# Patient Record
Sex: FEMALE | Race: WHITE | Employment: PART TIME | URBAN - NONMETROPOLITAN AREA
[De-identification: names, ages, dates, MRNs, and addresses within clinical notes are randomized per-mention and may not be internally consistent; named-entity substitution may affect disease eponyms.]

---

## 2023-12-03 NOTE — PROGRESS NOTES
[facility] initial consultation note    Naima Newton                                                Chief complaint: Naima Newton is a 26 y. o.oldfemale came for further evaluation regarding her hypersomnia (excessive daytime sleepiness) and ?sleep apnea  with referral from Ms. Vamshi Larry, APRN - CNP    [place]:    Sleep/Wake schedule:  Usual time to go to bed during the work/regular day of week: 8 PM to 10 PM  Usual time to wake up during the work//regular day of week: 4:30 AM to 6:30 AM  Over the weekends her sleep schedule: [x] Remain same. She usually falls a sleep in less than: 10 minutes to 1 hour  She takes naps: Yes. Number of naps per month: 2-3 times per month  During each nap she spends a total of: 1 to 4 hours  The naps were reported as refreshing: No      Sleep Hygiene:  Is the temperature and evironment in her bed room is acceptable to her: Yes. She watches Television in her bed room: Yes. Occasionally we walk. She read books, study, pay bills etc in the bed: Yes. She usually read books before going to sleep. She do not to work with any electronic media before going to sleep. Frequency She wake up during night/sleep: 2 or more  Majority of nocturnal awakenings are for urination: Yes. She goes to restroom sometimes  Difficulty in falling back to sleep after nocturnal awakenings: Yes. She takes up to 30 minutes to 1 hour to go back to sleep during her nocturnal awakenings    Do you drink coffee: Yes. She drinks 1 cup of coffee in the morning       Do you drink caffeinated beverages i.e sodas: Yes.  1 to 2 cans of caffeinated soda per throughout the day. Diet Pepsi or Dr. Liz Junior you drink tea: No.     Do you drink alcoholic beverages: Yes.   She drinks alcohol very rarely  History of recreational drug use: No    Social History     Tobacco Use    Smoking status: Former     Types: Cigarettes     Start date: 2019

## 2023-12-27 RX ORDER — PEPPERMINT OIL 90 MG
CAPSULE, DELAYED, AND EXTENDED RELEASE ORAL
COMMUNITY

## 2023-12-27 RX ORDER — POLYETHYLENE GLYCOL 3350 17 G/17G
17 POWDER, FOR SOLUTION ORAL DAILY PRN
COMMUNITY
End: 2023-12-29

## 2023-12-27 RX ORDER — DEXTROAMPHETAMINE SACCHARATE, AMPHETAMINE ASPARTATE, DEXTROAMPHETAMINE SULFATE AND AMPHETAMINE SULFATE 2.5; 2.5; 2.5; 2.5 MG/1; MG/1; MG/1; MG/1
10 TABLET ORAL DAILY
COMMUNITY

## 2023-12-27 RX ORDER — BUSPIRONE HYDROCHLORIDE 7.5 MG/1
7.5 TABLET ORAL 2 TIMES DAILY
COMMUNITY

## 2023-12-27 RX ORDER — HYDROCHLOROTHIAZIDE 25 MG/1
25 TABLET ORAL DAILY
COMMUNITY

## 2023-12-27 RX ORDER — ALBUTEROL SULFATE 90 UG/1
2 AEROSOL, METERED RESPIRATORY (INHALATION) EVERY 6 HOURS PRN
COMMUNITY

## 2023-12-27 RX ORDER — ONDANSETRON 4 MG/1
4 TABLET, FILM COATED ORAL EVERY 8 HOURS PRN
COMMUNITY

## 2023-12-27 RX ORDER — FLUTICASONE PROPIONATE 44 UG/1
1 AEROSOL, METERED RESPIRATORY (INHALATION) 2 TIMES DAILY
COMMUNITY

## 2023-12-27 RX ORDER — PEDI MV NO.227/FERROUS SULFATE 10 MG
TABLET,CHEWABLE ORAL
COMMUNITY

## 2023-12-27 RX ORDER — OMEPRAZOLE 40 MG/1
40 CAPSULE, DELAYED RELEASE ORAL DAILY
COMMUNITY
End: 2023-12-29

## 2023-12-27 RX ORDER — LOSARTAN POTASSIUM 100 MG/1
100 TABLET ORAL DAILY
COMMUNITY

## 2023-12-29 ENCOUNTER — OFFICE VISIT (OUTPATIENT)
Dept: PULMONOLOGY | Age: 23
End: 2023-12-29
Payer: COMMERCIAL

## 2023-12-29 VITALS
OXYGEN SATURATION: 98 % | SYSTOLIC BLOOD PRESSURE: 132 MMHG | HEIGHT: 66 IN | WEIGHT: 269 LBS | BODY MASS INDEX: 43.23 KG/M2 | DIASTOLIC BLOOD PRESSURE: 78 MMHG | TEMPERATURE: 98 F | HEART RATE: 107 BPM

## 2023-12-29 DIAGNOSIS — G47.10 HYPERSOMNIA: ICD-10-CM

## 2023-12-29 DIAGNOSIS — R06.83 SNORING: ICD-10-CM

## 2023-12-29 DIAGNOSIS — G47.30 SLEEP APNEA, UNSPECIFIED TYPE: Primary | ICD-10-CM

## 2023-12-29 PROCEDURE — G8484 FLU IMMUNIZE NO ADMIN: HCPCS | Performed by: INTERNAL MEDICINE

## 2023-12-29 PROCEDURE — G8419 CALC BMI OUT NRM PARAM NOF/U: HCPCS | Performed by: INTERNAL MEDICINE

## 2023-12-29 PROCEDURE — G8427 DOCREV CUR MEDS BY ELIG CLIN: HCPCS | Performed by: INTERNAL MEDICINE

## 2023-12-29 PROCEDURE — 99204 OFFICE O/P NEW MOD 45 MIN: CPT | Performed by: INTERNAL MEDICINE

## 2023-12-29 PROCEDURE — 4004F PT TOBACCO SCREEN RCVD TLK: CPT | Performed by: INTERNAL MEDICINE

## 2023-12-29 NOTE — PROGRESS NOTES
Chief Complaint: New sleep consult, no prior studies    Mallampati airway Class:4  Neck Circumference:.15.75 Inches    Jarales sleepiness score 12/29/23: 16  Sleep apnea quality of life questionnaire:.47

## 2023-12-29 NOTE — PATIENT INSTRUCTIONS
Recommendations/Plan:  - Schedule patient for nocturnal polysomnogram (Sleep study) with split night protocol at [facility] sleep lab to diagnose sleep apnea and to get optimal pressure I.e CPAP or BiPAP to start/continue PAP therapy. Patient to follow with my clinic at [facility] sleep clinic in 6 to 8 weeks with CPAP download for further evaluation.  -I had a discussion with patient regarding avialable treatment options for her sleep disorder breathing including but not limited to CPAP titration in the sleep lab Vs.Dental appliance placement with referral to a local dentist Vs other available surgical options including Uvulopalatopharyngoplasty, maxillomandibular ostomy, Inspire device placement and tracheostomy as last option. At the end of discussion, she decided on CPAP/BiPAP/AutoSV as a treatment if she found to have obstructive sleep apnea during above test/study.  -She was educated to practice good sleep hygiene practices. She was provided with a good sleep hygiene hand out.  -Dian Guevara was advised to make earlier appointment with my clinic if she develops any worsening of sleep symptoms. She verbalizes understanding.  -Dian Guevara was advised to not to drive any motor vehicles or operate heavy equipment until her sleep symptoms are under good control. Leyla Husain verbalizes understanding.  -She was advised to loose weight by controlling diet and doing exercise once cleared by her family physician.   - Louis Pascual and her brother were educated about my impression and plan. They verbalizes understanding.

## 2024-02-09 ENCOUNTER — HOSPITAL ENCOUNTER (OUTPATIENT)
Dept: SLEEP CENTER | Age: 24
End: 2024-02-09
Payer: COMMERCIAL

## 2024-02-09 DIAGNOSIS — G47.10 HYPERSOMNIA: ICD-10-CM

## 2024-02-09 DIAGNOSIS — G47.30 SLEEP APNEA, UNSPECIFIED TYPE: ICD-10-CM

## 2024-02-09 DIAGNOSIS — R06.83 SNORING: ICD-10-CM

## 2024-02-09 PROCEDURE — 95810 POLYSOM 6/> YRS 4/> PARAM: CPT
